# Patient Record
Sex: MALE | URBAN - METROPOLITAN AREA
[De-identification: names, ages, dates, MRNs, and addresses within clinical notes are randomized per-mention and may not be internally consistent; named-entity substitution may affect disease eponyms.]

---

## 2018-11-29 ENCOUNTER — OFFICE VISIT (OUTPATIENT)
Dept: URBAN - METROPOLITAN AREA CLINIC 23 | Facility: CLINIC | Age: 58
End: 2018-11-29
Payer: MEDICAID

## 2018-11-29 DIAGNOSIS — H53.19 OTHER SUBJECTIVE VISUAL DISTURBANCES: Primary | ICD-10-CM

## 2018-11-29 PROCEDURE — 92083 EXTENDED VISUAL FIELD XM: CPT | Performed by: OPTOMETRIST

## 2018-11-29 PROCEDURE — 92002 INTRM OPH EXAM NEW PATIENT: CPT | Performed by: OPTOMETRIST

## 2018-11-29 ASSESSMENT — VISUAL ACUITY
OS: 20/70
OD: 20/40

## 2018-11-29 ASSESSMENT — KERATOMETRY
OD: 50.88
OS: 47.38

## 2018-11-29 ASSESSMENT — INTRAOCULAR PRESSURE
OS: 11
OD: 11

## 2018-11-29 NOTE — IMPRESSION/PLAN
Impression: Other subjective visual disturbances: H53.19. Plan: VF done. Discussed condition with pt. No cataracts seen that are affecting vision. Neurological issue. Pt can return for refraction to improve vision.

## 2018-11-30 ENCOUNTER — OFFICE VISIT (OUTPATIENT)
Dept: URBAN - METROPOLITAN AREA CLINIC 40 | Facility: CLINIC | Age: 58
End: 2018-11-30
Payer: COMMERCIAL

## 2018-11-30 DIAGNOSIS — H52.4 PRESBYOPIA: Primary | ICD-10-CM

## 2018-11-30 PROCEDURE — 92014 COMPRE OPH EXAM EST PT 1/>: CPT | Performed by: OPTOMETRIST

## 2018-11-30 PROCEDURE — 92015 DETERMINE REFRACTIVE STATE: CPT | Performed by: OPTOMETRIST

## 2018-11-30 ASSESSMENT — VISUAL ACUITY
OS: 20/40
OD: 20/40

## 2018-11-30 ASSESSMENT — KERATOMETRY
OD: 50.88
OS: 47.63

## 2024-07-26 ENCOUNTER — OFFICE VISIT (OUTPATIENT)
Dept: URBAN - METROPOLITAN AREA CLINIC 28 | Facility: CLINIC | Age: 64
End: 2024-07-26
Payer: MEDICARE

## 2024-07-26 DIAGNOSIS — Z79.84 LONG TERM (CURRENT) USE OF ORAL HYPOGLYCEMIC DRUGS: ICD-10-CM

## 2024-07-26 DIAGNOSIS — H52.4 PRESBYOPIA: ICD-10-CM

## 2024-07-26 DIAGNOSIS — H25.13 AGE-RELATED NUCLEAR CATARACT, BILATERAL: ICD-10-CM

## 2024-07-26 DIAGNOSIS — H53.462 HOMONYMOUS BILATERAL FIELD DEFECTS, LEFT SIDE: ICD-10-CM

## 2024-07-26 DIAGNOSIS — E11.9 TYPE 2 DIABETES MELLITUS W/O COMPLICATION: Primary | ICD-10-CM

## 2024-07-26 PROCEDURE — 92004 COMPRE OPH EXAM NEW PT 1/>: CPT | Performed by: OPTOMETRIST

## 2024-07-26 ASSESSMENT — VISUAL ACUITY
OD: 20/80
OS: 20/30

## 2024-07-26 ASSESSMENT — INTRAOCULAR PRESSURE
OD: 12
OS: 12

## 2024-07-26 ASSESSMENT — KERATOMETRY: OS: 47.50
